# Patient Record
Sex: MALE | Race: OTHER
[De-identification: names, ages, dates, MRNs, and addresses within clinical notes are randomized per-mention and may not be internally consistent; named-entity substitution may affect disease eponyms.]

---

## 2017-02-04 ENCOUNTER — HOSPITAL ENCOUNTER (EMERGENCY)
Dept: HOSPITAL 72 - EMR | Age: 27
LOS: 1 days | Discharge: ANOTHER HOSPITAL | End: 2017-02-05
Payer: MEDICAID

## 2017-02-04 VITALS — BODY MASS INDEX: 23.7 KG/M2 | HEIGHT: 72 IN | WEIGHT: 175 LBS

## 2017-02-04 VITALS — SYSTOLIC BLOOD PRESSURE: 129 MMHG | DIASTOLIC BLOOD PRESSURE: 75 MMHG

## 2017-02-04 VITALS — SYSTOLIC BLOOD PRESSURE: 119 MMHG | DIASTOLIC BLOOD PRESSURE: 55 MMHG

## 2017-02-04 DIAGNOSIS — F17.200: ICD-10-CM

## 2017-02-04 DIAGNOSIS — Z23: ICD-10-CM

## 2017-02-04 DIAGNOSIS — S92.312B: Primary | ICD-10-CM

## 2017-02-04 DIAGNOSIS — M79.5: ICD-10-CM
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
## 2017-02-04 LAB
ALBUMIN/GLOB SERPL: 1.8 {RATIO} (ref 1–2.7)
ALT SERPL-CCNC: 10 U/L (ref 3–41)
ANION GAP SERPL CALC-SCNC: 15 MMOL/L (ref 5–15)
APTT BLD: 31 SEC (ref 23–33)
AST SERPL-CCNC: 18 U/L (ref 5–40)
BASOPHILS NFR BLD AUTO: 1.3 % (ref 0–2)
CALCIUM SERPL-MCNC: 9.1 MG/DL (ref 8.6–10.2)
CHLORIDE SERPL-SCNC: 99 MEQ/L (ref 98–107)
CO2 SERPL-SCNC: 27 MEQ/L (ref 20–30)
CREAT SERPL-MCNC: 0.8 MG/DL (ref 0.7–1.2)
EOSINOPHIL NFR BLD AUTO: 2.9 % (ref 0–3)
ERYTHROCYTE [DISTWIDTH] IN BLOOD BY AUTOMATED COUNT: 12.6 % (ref 11.6–14.8)
GFR SERPLBLD BASED ON 1.73 SQ M-ARVRAT: > 60 ML/MIN (ref 60–?)
GLOBULIN SER-MCNC: 2.5 G/DL
HEMOLYSIS: 10
INR PPP: 1.2 (ref 0.9–1.1)
LYMPHOCYTES NFR BLD AUTO: 45.3 % (ref 20–45)
MCH RBC QN AUTO: 29.8 PG (ref 27–31)
MCHC RBC AUTO-ENTMCNC: 33 G/DL (ref 32–36)
MCV RBC AUTO: 90 FL (ref 80–99)
MONOCYTES NFR BLD AUTO: 6.1 % (ref 1–10)
NEUTROPHILS NFR BLD AUTO: 44.4 % (ref 45–75)
PLATELET # BLD: 206 K/UL (ref 150–450)
PMV BLD AUTO: 7.5 FL (ref 6.5–10.1)
POTASSIUM SERPL-SCNC: 3.8 MEQ/L (ref 3.4–4.9)
PROT SERPL-MCNC: 7.2 G/DL (ref 6.6–8.7)
PROTHROMBIN TIME: 12.7 SEC (ref 9.3–11.5)
RBC # BLD AUTO: 5.19 M/UL (ref 4.7–6.1)
SODIUM SERPL-SCNC: 141 MEQ/L (ref 135–145)
WBC # BLD AUTO: 11.4 K/UL (ref 4.8–10.8)

## 2017-02-04 PROCEDURE — 86900 BLOOD TYPING SEROLOGIC ABO: CPT

## 2017-02-04 PROCEDURE — 80053 COMPREHEN METABOLIC PANEL: CPT

## 2017-02-04 PROCEDURE — 36415 COLL VENOUS BLD VENIPUNCTURE: CPT

## 2017-02-04 PROCEDURE — 86850 RBC ANTIBODY SCREEN: CPT

## 2017-02-04 PROCEDURE — 87070 CULTURE OTHR SPECIMN AEROBIC: CPT

## 2017-02-04 PROCEDURE — 96375 TX/PRO/DX INJ NEW DRUG ADDON: CPT

## 2017-02-04 PROCEDURE — 96374 THER/PROPH/DIAG INJ IV PUSH: CPT

## 2017-02-04 PROCEDURE — 90715 TDAP VACCINE 7 YRS/> IM: CPT

## 2017-02-04 PROCEDURE — 96372 THER/PROPH/DIAG INJ SC/IM: CPT

## 2017-02-04 PROCEDURE — 85610 PROTHROMBIN TIME: CPT

## 2017-02-04 PROCEDURE — 86901 BLOOD TYPING SEROLOGIC RH(D): CPT

## 2017-02-04 PROCEDURE — 85025 COMPLETE CBC W/AUTO DIFF WBC: CPT

## 2017-02-04 PROCEDURE — 85730 THROMBOPLASTIN TIME PARTIAL: CPT

## 2017-02-04 PROCEDURE — 87205 SMEAR GRAM STAIN: CPT

## 2017-02-04 PROCEDURE — 90471 IMMUNIZATION ADMIN: CPT
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
## 2017-02-05 VITALS — DIASTOLIC BLOOD PRESSURE: 65 MMHG | SYSTOLIC BLOOD PRESSURE: 115 MMHG

## 2017-02-05 VITALS — DIASTOLIC BLOOD PRESSURE: 53 MMHG | SYSTOLIC BLOOD PRESSURE: 111 MMHG

## 2017-02-05 VITALS — SYSTOLIC BLOOD PRESSURE: 123 MMHG | DIASTOLIC BLOOD PRESSURE: 74 MMHG

## 2017-02-05 VITALS — DIASTOLIC BLOOD PRESSURE: 57 MMHG | SYSTOLIC BLOOD PRESSURE: 110 MMHG

## 2017-02-05 VITALS — SYSTOLIC BLOOD PRESSURE: 113 MMHG | DIASTOLIC BLOOD PRESSURE: 64 MMHG

## 2017-02-05 VITALS — SYSTOLIC BLOOD PRESSURE: 113 MMHG | DIASTOLIC BLOOD PRESSURE: 63 MMHG

## 2017-02-05 VITALS — SYSTOLIC BLOOD PRESSURE: 111 MMHG | DIASTOLIC BLOOD PRESSURE: 56 MMHG
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
## 2017-05-13 ENCOUNTER — HOSPITAL ENCOUNTER (EMERGENCY)
Dept: HOSPITAL 72 - EMR | Age: 27
Discharge: HOME | End: 2017-05-13
Payer: COMMERCIAL

## 2017-05-13 VITALS — BODY MASS INDEX: 22.53 KG/M2 | WEIGHT: 170 LBS | HEIGHT: 73 IN

## 2017-05-13 VITALS — DIASTOLIC BLOOD PRESSURE: 82 MMHG | SYSTOLIC BLOOD PRESSURE: 139 MMHG

## 2017-05-13 VITALS — DIASTOLIC BLOOD PRESSURE: 63 MMHG | SYSTOLIC BLOOD PRESSURE: 123 MMHG

## 2017-05-13 DIAGNOSIS — J36: Primary | ICD-10-CM

## 2017-05-13 DIAGNOSIS — J45.909: ICD-10-CM

## 2017-05-13 PROCEDURE — 10060 I&D ABSCESS SIMPLE/SINGLE: CPT

## 2017-05-13 PROCEDURE — 99284 EMERGENCY DEPT VISIT MOD MDM: CPT

## 2017-05-13 PROCEDURE — 96374 THER/PROPH/DIAG INJ IV PUSH: CPT

## 2017-05-13 PROCEDURE — 96375 TX/PRO/DX INJ NEW DRUG ADDON: CPT

## 2017-05-13 NOTE — EMERGENCY ROOM REPORT
History of Present Illness


General


Chief Complaint:  Sore Throat


Source:  Patient





Present Illness


HPI


28 y/o male c/o sore throat x 3 days. Assoc sxs include sore throat with 

difficulty swallowing saliva. No other  physical complaints or modifying 

factors. Not taking any current medications. Denies any current n/v/f/c/d, abd 

pain, back pain, neck pain, photophobia, phonophobia, CP, SOB or headache.


Allergies:  


Coded Allergies:  


     No Known Allergies (Unverified , 2/4/17)





Patient History


Past Medical History:  see triage record


Past Surgical History:  none


Pertinent Family History:  none


Immunizations:  UTD


Reviewed Nursing Documentation:  PMH: Agreed, PSxH: Agreed





Nursing Documentation-PMH


Past Medical History:  No Stated History


Hx Asthma:  Yes





Review of Systems


All Other Systems:  negative except mentioned in HPI





Physical Exam





Vital Signs








  Date Time  Temp Pulse Resp B/P Pulse Ox O2 Delivery O2 Flow Rate FiO2


 


5/13/17 20:32 99.7 98 16 120/81 97 Room Air  








Sp02 EP Interpretation:  reviewed, normal


General Appearance:  no apparent distress, alert, GCS 15, non-toxic, other - 

uncomfortable


Head:  normocephalic, atraumatic


Eyes:  bilateral eye PERRL, bilateral eye normal inspection


ENT:  hearing grossly normal, no angioedema, other - uvula deviates to the left 

with peritonsilar abscess present on right side. No trismus.


Neck:  normal inspection


Respiratory:  chest non-tender, lungs clear, normal breath sounds, speaking 

full sentences


Cardiovascular #1:  regular rate, rhythm, no edema


Neurologic:  alert, oriented x3, responsive, motor strength/tone normal, 

sensory intact


Psychiatric:  judgement/insight normal, memory normal, mood/affect normal, no 

suicidal/homicidal ideation


Skin:  normal color, no rash, warm/dry, well hydrated





Procedures


Additional Procedure


Procedure Narrative


Under sterile conditions and going over risks and benefits and verbal consent 

obtained, numbed area with viscous lidocaine gargle. Approximately 2cc of 

lidocaine 1% without epi was injected with 27g 1" needle with sheath still on 

and 1cm removed from tip to act as guide to protect carotid artery was inserted 

into abscess. An 18g needle that had sheath with 1cm removed from tip to act as 

protective stopper from penetrating carotid artery was then advanced into the 

abscess and scant amount of purulent fluid was aspirated from abscess. Multiple 

attempts to aspirate additional fluid was completed without success.The patient 

was instructed in post procedure care. Patient tolerated procedure well w/ 

minimal blood loss.





Medical Decision Making


PA Attestation


Dr. Rai is my supervising physician with whom patient management has been 

discussed with.


Diagnostic Impression:  


 Primary Impression:  


 Peritonsillar abscess


ER Course


Pt. presents to the ED c/o sore throat


Ddx considered but are not limited to viral pharyngitis, bacterial pharyngitis, 

peritonsillar abscess, tonsils stone and meningitis


Vital signs: are WNL, pt. is afebrile 


H&PE are most consistent with peritonsilar abscess


ORDERS: Clindamycin 900mg, Solu-medrol 125mg, 30mg Toradol, Viscous Lidocaine 

and 1% Lidocaine w/o epi for anesthesia. 


ED INTERVENTIONS:  Aspiration of Abscess.


DISCHARGE: At this time pt. is stable for d/c to home. Will provide printed 

patient care instructions, and any necessary prescriptions. Care plan and 

follow up instructions have been discussed with the patient prior to discharge.





Last Vital Signs








  Date Time  Temp Pulse Resp B/P Pulse Ox O2 Delivery O2 Flow Rate FiO2


 


5/13/17 20:32 99.7 98 16 120/81 97 Room Air  








Disposition:  HOME, SELF-CARE


Condition:  Stable


Scripts


Clindamycin Hcl (CLINDAMYCIN HCL) 300 Mg Capsule


300 MG ORAL TID for 10 Days, #30 CAP


   Prov: JAZMIN ANNA         5/13/17


Patient Instructions:  Peritonsillar Abscess





Additional Instructions:  


Take medication as directed. Advised patient to use salt water gargle PRN. 

Advised patient to use chloraseptic as needed for throat pain in addition to 

APAP Q4H. Patient advised they can take Ibuprofen and Tylenol Q6H together for 

fever control as well. If sxs worsen or don't improve, please return sooner. Go 

to the ER if you develop SOB, CP, Rash, photophobia, neck pain, throat swelling 

occur, go to the ER immediately.











JAZMIN ANNA May 13, 2017 21:11

## 2018-02-22 ENCOUNTER — HOSPITAL ENCOUNTER (EMERGENCY)
Dept: HOSPITAL 72 - EMR | Age: 28
Discharge: HOME | End: 2018-02-22
Payer: COMMERCIAL

## 2018-02-22 VITALS — DIASTOLIC BLOOD PRESSURE: 82 MMHG | SYSTOLIC BLOOD PRESSURE: 139 MMHG

## 2018-02-22 VITALS — WEIGHT: 175 LBS | BODY MASS INDEX: 23.7 KG/M2 | HEIGHT: 72 IN

## 2018-02-22 DIAGNOSIS — J45.909: ICD-10-CM

## 2018-02-22 DIAGNOSIS — J36: Primary | ICD-10-CM

## 2018-02-22 PROCEDURE — 96361 HYDRATE IV INFUSION ADD-ON: CPT

## 2018-02-22 PROCEDURE — 96374 THER/PROPH/DIAG INJ IV PUSH: CPT

## 2018-02-22 PROCEDURE — 96365 THER/PROPH/DIAG IV INF INIT: CPT

## 2018-02-22 PROCEDURE — 99284 EMERGENCY DEPT VISIT MOD MDM: CPT

## 2018-02-22 PROCEDURE — 96375 TX/PRO/DX INJ NEW DRUG ADDON: CPT

## 2018-02-22 PROCEDURE — 10060 I&D ABSCESS SIMPLE/SINGLE: CPT

## 2018-05-20 ENCOUNTER — HOSPITAL ENCOUNTER (EMERGENCY)
Dept: HOSPITAL 72 - EMR | Age: 28
LOS: 1 days | Discharge: HOME | End: 2018-05-21
Payer: COMMERCIAL

## 2018-05-20 VITALS — WEIGHT: 178 LBS | HEIGHT: 72 IN | BODY MASS INDEX: 24.11 KG/M2

## 2018-05-20 VITALS — SYSTOLIC BLOOD PRESSURE: 120 MMHG | DIASTOLIC BLOOD PRESSURE: 73 MMHG

## 2018-05-20 DIAGNOSIS — G57.92: Primary | ICD-10-CM

## 2018-05-20 PROCEDURE — 99283 EMERGENCY DEPT VISIT LOW MDM: CPT
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
## 2018-10-18 ENCOUNTER — HOSPITAL ENCOUNTER (EMERGENCY)
Dept: HOSPITAL 72 - EMR | Age: 28
Discharge: HOME | End: 2018-10-18
Payer: COMMERCIAL

## 2018-10-18 VITALS — DIASTOLIC BLOOD PRESSURE: 65 MMHG | SYSTOLIC BLOOD PRESSURE: 117 MMHG

## 2018-10-18 VITALS — DIASTOLIC BLOOD PRESSURE: 61 MMHG | SYSTOLIC BLOOD PRESSURE: 114 MMHG

## 2018-10-18 VITALS — BODY MASS INDEX: 25.06 KG/M2 | HEIGHT: 72 IN | WEIGHT: 185 LBS

## 2018-10-18 VITALS — SYSTOLIC BLOOD PRESSURE: 119 MMHG | DIASTOLIC BLOOD PRESSURE: 68 MMHG

## 2018-10-18 DIAGNOSIS — S80.02XA: ICD-10-CM

## 2018-10-18 DIAGNOSIS — S80.01XA: ICD-10-CM

## 2018-10-18 DIAGNOSIS — S80.212A: ICD-10-CM

## 2018-10-18 DIAGNOSIS — S60.221A: ICD-10-CM

## 2018-10-18 DIAGNOSIS — S80.211A: ICD-10-CM

## 2018-10-18 DIAGNOSIS — S30.1XXA: Primary | ICD-10-CM
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
## 2018-10-18 DIAGNOSIS — S60.222A: ICD-10-CM
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
## 2018-10-18 DIAGNOSIS — S60.511A: ICD-10-CM

## 2018-10-18 DIAGNOSIS — S60.512A: ICD-10-CM

## 2018-10-18 LAB
APPEARANCE UR: CLEAR
APTT PPP: YELLOW S
GLUCOSE UR STRIP-MCNC: NEGATIVE MG/DL
KETONES UR QL STRIP: NEGATIVE
LEUKOCYTE ESTERASE UR QL STRIP: NEGATIVE
NITRITE UR QL STRIP: NEGATIVE
PH UR STRIP: 6 [PH] (ref 4.5–8)
PROT UR QL STRIP: NEGATIVE
SP GR UR STRIP: 1.02 (ref 1–1.03)
UROBILINOGEN UR-MCNC: NORMAL MG/DL (ref 0–1)

## 2018-10-18 PROCEDURE — 81003 URINALYSIS AUTO W/O SCOPE: CPT

## 2018-10-18 PROCEDURE — 74176 CT ABD & PELVIS W/O CONTRAST: CPT

## 2018-10-18 PROCEDURE — 99284 EMERGENCY DEPT VISIT MOD MDM: CPT

## 2018-10-18 NOTE — DIAGNOSTIC IMAGING REPORT
Indication: left hand pain. 

 

Findings: 3 views of the left hand were obtained. 

 

Normal alignment is demonstrated. No acute fractures, erosions, or periosteal

reaction are seen. Soft tissues are unremarkable.

 

Impression: No acute findings.

## 2018-10-18 NOTE — DIAGNOSTIC IMAGING REPORT
Indication: Pain  Knee pain/trauma

 

 3 views of the right knee were obtained. 

 

Findings:  No acute fracture, malalignment, or joint effusion are identified. Joint

space is relatively well-maintained. There is mild anterior soft tissue swelling.

 

Impression: No acute fracture. Mild anterior soft tissue swelling

## 2018-10-18 NOTE — DIAGNOSTIC IMAGING REPORT
Indication: Right hand pain 

 

Findings: 3 views of the right hand were obtained. 

 

Normal bony mineralization and alignment are demonstrated. No acute fractures,

erosions, or periosteal reaction are seen. Soft tissues are unremarkable.

 

Impression: No acute findings.
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
## 2018-10-18 NOTE — DIAGNOSTIC IMAGING REPORT
Indication: Knee Pain

 

 3 views of the left knee were obtained. 

 

Findings:  No acute fracture, malalignment, or joint effusion are identified. Joint

space is relatively well-maintained. 

 

Impression:  Negative for acute injury

## 2018-10-18 NOTE — DIAGNOSTIC IMAGING REPORT
Indication: Abdominal pain

 

Technique: Continuous helical transaxial imaging of the abdomen and pelvis was

obtained from the lung bases to the pubic symphysis. No intravenous contrast was

administered. Coronal 2-D reformats were also obtained. Automatic Exposure Control

was utilized.

 

 

Total Dose length Product (DLP):  710.59 mGycm

 

CT Dose Index Volume (CTDIvol):   12.37 mGy

 

Comparison: none

 

Findings: The lung bases are clear. Evaluation of solid organs is limited on

noncontrast imaging. That said no obvious injury of the liver or other solid organs

identified. No free fluid seen. Unopacified colon and small bowel appear grossly

unremarkable. There is no hydronephrosis.

 

IMPRESSION:

 

No acute findings. Limited evaluation.

 

[facility] has communicated the preliminary results to the Emergency

Department.  Their findings are largely concordant with this report.

 

 

The CT scanner at [facility] is accredited by the [employer]

[employer] and the scans are performed using dose optimization techniques as

appropriate to a performed exam including Automatic Exposure control.

## 2018-12-11 ENCOUNTER — HOSPITAL ENCOUNTER (EMERGENCY)
Dept: HOSPITAL 72 - EMR | Age: 28
LOS: 1 days | Discharge: HOME | End: 2018-12-12
Payer: COMMERCIAL

## 2018-12-11 VITALS — HEIGHT: 72 IN | BODY MASS INDEX: 23.7 KG/M2 | WEIGHT: 175 LBS

## 2018-12-11 DIAGNOSIS — R31.9: ICD-10-CM

## 2018-12-11 DIAGNOSIS — N48.1: Primary | ICD-10-CM

## 2018-12-11 PROCEDURE — 99283 EMERGENCY DEPT VISIT LOW MDM: CPT

## 2018-12-11 PROCEDURE — 81003 URINALYSIS AUTO W/O SCOPE: CPT

## 2018-12-12 VITALS — SYSTOLIC BLOOD PRESSURE: 119 MMHG | DIASTOLIC BLOOD PRESSURE: 75 MMHG

## 2018-12-12 LAB
APPEARANCE UR: CLEAR
APTT PPP: (no result) S
GLUCOSE UR STRIP-MCNC: NEGATIVE MG/DL
KETONES UR QL STRIP: NEGATIVE
LEUKOCYTE ESTERASE UR QL STRIP: (no result)
NITRITE UR QL STRIP: NEGATIVE
PH UR STRIP: 8 [PH] (ref 4.5–8)
PROT UR QL STRIP: NEGATIVE
SP GR UR STRIP: 1.01 (ref 1–1.03)
UROBILINOGEN UR-MCNC: 1 MG/DL (ref 0–1)

## 2018-12-12 NOTE — EMERGENCY ROOM REPORT
History of Present Illness


General


Chief Complaint:  Male Urogenital Problems


Source:  Patient





Present Illness


HPI


Patient is a 28-year-old male who presented after increased hematuria.  Patient 

reports having intermittent episodes where he began having increased blood in 

his urine.  He reports having no pain.  He reports having increased hematuria 

with termination of urination primarily he additionally reports having 

increased the discomfort to his foreskin area.  He reports having increased 

cheesy-like discharge.


Allergies:  


Coded Allergies:  


     OXYCODONE (Verified  Allergy, Unknown, 2/22/18)


 itchy





Patient History


Past Medical History:  see triage record


Reviewed Nursing Documentation:  PMH: Agreed; PSxH: Agreed





Review of Systems


All Other Systems:  negative except mentioned in HPI





Physical Exam





Vital Signs








  Date Time  Temp Pulse Resp B/P (MAP) Pulse Ox O2 Delivery O2 Flow Rate FiO2


 


12/11/18 23:45 98.1 59 16 119/75 95 Room Air  








General Appearance:  well appearing, no apparent distress, alert, GCS 15


Head:  normocephalic, atraumatic


ENT:  hearing grossly normal, normal voice


Neck:  full range of motion, supple


Respiratory:  no respiratory distress, speaking full sentences


Gastrointestinal:  normal inspection, non tender


Genitourinary:  normal inspection, other - uncircumcised with white yellow 

material under foreskin


Musculoskeletal:  normal inspection, back normal


Neurologic:  normal inspection, alert, oriented x3, responsive, normal gait


Psychiatric:  mood/affect normal


Skin:  no rash





Medical Decision Making


Diagnostic Impression:  


 Primary Impression:  


 Balanitis


 Additional Impression:  


 Hematuria of undiagnosed cause


ER Course


Patient presented for hematuria.  Differential diagnosis included was not 

limited to renal stone, cancer, coagulopathy, epididymitis, orchitis among 

others. Patient has a benign exam and does not appear to require any further 

imaging or laboratory testing at this time.  The patient does not show any 

evidence of gross hematuria at this time.  The urinalysis showed evidence of 

microscopic hematuria.  The patient was noted to have symptoms primarily during 

ejaculation and termination of urination consistent with the problem with the 

seminal vesicle or prostate.  The patient is advised urology follow-up for 

further evaluation.  The patient be empiric which treated with ciprofloxacin 

and the may require further imaging was advised of this.The patient does not 

have any evidence of torsion at this time





Labs








Test


  12/12/18


00:25


 


Urine Color Pale yellow 


 


Urine Appearance Clear 


 


Urine pH 8 (4.5-8.0) 


 


Urine Specific Gravity


  1.010


(1.005-1.035)


 


Urine Protein


  Negative


(NEGATIVE)


 


Urine Glucose (UA)


  Negative


(NEGATIVE)


 


Urine Ketones


  Negative


(NEGATIVE)


 


Urine Blood 5+ (NEGATIVE) 


 


Urine Nitrite


  Negative


(NEGATIVE)


 


Urine Bilirubin


  Negative


(NEGATIVE)


 


Urine Urobilinogen


  1 MG/DL


(0.0-1.0)


 


Urine Leukocyte Esterase 1+ (NEGATIVE) 


 


Urine RBC


  Tntc /HPF (0 -


0)


 


Urine WBC


  0-2 /HPF (0 -


0)


 


Urine Squamous Epithelial


Cells None /LPF


(NONE/OCC)


 


Urine Bacteria


  Few /HPF


(NONE)











Last Vital Signs








  Date Time  Temp Pulse Resp B/P (MAP) Pulse Ox O2 Delivery O2 Flow Rate FiO2


 


12/11/18 23:45 98.1 59 16 119/75 95 Room Air  








Status:  improved


Disposition:  HOME, SELF-CARE


Condition:  Stable


Scripts


Ciprofloxacin Hcl* (CIPROFLOXACIN HCL*) 500 Mg Tablet


500 MG ORAL Q12H, #14 TAB 0 Refills


   Prov: Mike Dickey MD         12/12/18 


Nystatin* (NYSTATIN*) 15 Gm Cream..g.


1 APPLIC TOPIC TWICE A DAY, #30 GM


   Prov: Mike Dickey MD         12/12/18


Patient Instructions:  Hematuria, Adult





Additional Instructions:  


Follow up with you primary care physician for urology referral.











Mike Dickey MD Dec 12, 2018 01:28